# Patient Record
Sex: MALE | Race: WHITE | Employment: UNEMPLOYED | URBAN - NONMETROPOLITAN AREA
[De-identification: names, ages, dates, MRNs, and addresses within clinical notes are randomized per-mention and may not be internally consistent; named-entity substitution may affect disease eponyms.]

---
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
## 2023-08-18 ENCOUNTER — HOSPITAL ENCOUNTER (EMERGENCY)
Age: 23
Discharge: HOME | End: 2023-08-18
Attending: EMERGENCY MEDICINE
Payer: COMMERCIAL

## 2023-08-18 VITALS
HEART RATE: 111 BPM | TEMPERATURE: 98.4 F | DIASTOLIC BLOOD PRESSURE: 111 MMHG | OXYGEN SATURATION: 97 % | RESPIRATION RATE: 18 BRPM | SYSTOLIC BLOOD PRESSURE: 175 MMHG

## 2023-08-18 DIAGNOSIS — S39.012A LOW BACK STRAIN, INITIAL ENCOUNTER: Primary | ICD-10-CM

## 2023-08-18 PROCEDURE — 99282 EMERGENCY DEPT VISIT SF MDM: CPT

## 2023-08-18 ASSESSMENT — PAIN SCALES - GENERAL
PAINLEVEL_OUTOF10: 6
PAINLEVEL_OUTOF10: 6

## 2023-08-18 ASSESSMENT — PAIN - FUNCTIONAL ASSESSMENT
PAIN_FUNCTIONAL_ASSESSMENT: 0-10
PAIN_FUNCTIONAL_ASSESSMENT: 0-10

## 2023-08-18 ASSESSMENT — ENCOUNTER SYMPTOMS
SHORTNESS OF BREATH: 0
SORE THROAT: 0
ABDOMINAL DISTENTION: 0
BACK PAIN: 1

## 2023-08-18 ASSESSMENT — PAIN DESCRIPTION - ORIENTATION
ORIENTATION: LOWER
ORIENTATION: LOWER

## 2023-08-18 ASSESSMENT — PAIN DESCRIPTION - DESCRIPTORS
DESCRIPTORS: DISCOMFORT
DESCRIPTORS: STABBING

## 2023-08-18 ASSESSMENT — PAIN DESCRIPTION - LOCATION
LOCATION: BACK
LOCATION: BACK

## 2023-08-18 ASSESSMENT — LIFESTYLE VARIABLES: HOW OFTEN DO YOU HAVE A DRINK CONTAINING ALCOHOL: NEVER

## 2023-08-18 NOTE — DISCHARGE INSTRUCTIONS
Continue to finish the course of the tizanidine along with the steroids. Recommend trying heating pad, IcyHot patches or lidocaine patches as well intermittently to help with the muscle spasms. Continue with stretching exercises at home. Follow-up with your doctor next week if symptoms do not improve.

## 2023-08-18 NOTE — ED PROVIDER NOTES
[facility] ED  EMERGENCY DEPARTMENT ENCOUNTER      Pt Name: Luis Best  MRN: [id number]  [address] [facility] 2000  Date of evaluation: 8/18/2023  Provider: Jj Starr       Chief Complaint   Patient presents with    Back Pain     C/o lower back pain. Starting on 08/13 while walking up the stairs. Seen in urgent care and given tizanidine and steroids without relief         HISTORY OF PRESENT ILLNESS   (Location/Symptom, Timing/Onset, Context/Setting, Quality, Duration, Modifying Factors, Severity)  Note limiting factors. Luis Best is a 21 y.o. male who presents to the emergency department with complaints of lower back pain. Patient states that the pain started 4 days ago when he was at work and walking up the stairs. He denies lifting anything heavy or bending or doing anything unusual at work. He was seen in urgent care the next day and they gave him tizanidine and steroids which helped him for the past 3 days but today the pain did not seem to go away so he came in for evaluation. Patient also states that he came in because he does not think he can go to work today or the next couple days as the medication he is taking makes him fall asleep within 2 hours. He had a work note from urgent care for the past 3 days but would like to have on for the next few days while he is taking medications. He has tried the stretching exercises at home with a little bit of relief. He has not tried any other over-the-counter medications or patches to help with the pain. He denies any radiation of pain down his legs. He denies any bowel or bladder incontinence. He has no other concerns. REVIEW OF SYSTEMS    (2-9 systems for level 4, 10 or more for level 5)     Review of Systems   Constitutional:  Negative for fatigue and fever. HENT:  Negative for congestion and sore throat. Eyes:  Negative for visual disturbance. Respiratory:  Negative for shortness of breath. Physician            Chang Valderrama DO  08/18/23 [id number]